# Patient Record
(demographics unavailable — no encounter records)

---

## 2024-10-18 NOTE — PROCEDURE
[Straight Catheterization] : insertion of a straight catheter [Urinary Tract Infection] : a urinary tract infection [___ Fr Straight Tip] : a [unfilled] in [place] straight tip catheter [None] : there were no complications with the catheter insertion [Clear] : clear [Culture] : culture

## 2024-10-18 NOTE — PHYSICAL EXAM
[Chaperone Present] : A chaperone was present in the examining room during all aspects of the physical examination [Labia Majora] : were normal [Normal Appearance] : general appearance was normal [Atrophy] : atrophy [No Bleeding] : there was no active vaginal bleeding [Absent] : absent [Normal] : no abnormalities [Exam Deferred] : was deferred [65098] : A chaperone was present during the pelvic exam. [FreeTextEntry2] : Elsa [FreeTextEntry1] : General: Well, appearing. Alert and orientated. No acute distress HEENT: Normocephalic, atraumatic and extraocular muscles appear to be intact Neck: Full range of motion, no obvious lymphadenopathy, deformities, or masses noted Respiratory: Speaking in full sentences comfortably, normal work of breathing and no cough during visit Musculoskeletal: full range of motion  Extremities: No upper extremity edema noted Skin: no obvious rash or skin lesions Neuro: Orientated X 3, speech is fluent, normal rate Psych: Normal mood and affect [Tenderness] : ~T no ~M abdominal tenderness observed [Distended] : not distended [Scar] : a scar was noted [Vulvar Atrophy] : vulvar atrophy [Cystocele] : a cystocele [2] : 2 [Aa ____] : Aa [unfilled] [Ba ____] : Ba [unfilled] [C ____] : C [unfilled] [GH ____] : GH [unfilled] [PB ____] : PB [unfilled] [TVL ____] : TVL  [unfilled] [Ap ____] : Ap [unfilled] [Bp ____] : Bp [unfilled] [D ____] : D [unfilled] [FreeTextEntry3] : (+) hypermobility, (-) cough stress test  [FreeTextEntry4] : No mesh seen on exam; however, there was a small divet at the vaginal cuff

## 2024-10-18 NOTE — LETTER BODY
[Dear  ___] : Dear  [unfilled], [I had the pleasure of evaluating your patient, [unfilled]. Thank you for referring Ms. [unfilled] for consultation for ___] : I had the pleasure of evaluating your patient, [unfilled]. Thank you for referring Ms. [unfilled] for consultation for [unfilled]. [Attached please find my note.] : Attached please find my note. [Thank you very much for allowing me to participate in the care of this patient. If you have any questions, please do not hesitate to contact me] : Thank you very much for allowing me to participate in the care of this patient. If you have any questions, please do not hesitate to contact me. [FreeTextEntry1] : prolapse, recurrent UTIs, mixed urgency and stress incontinence

## 2024-10-18 NOTE — DISCUSSION/SUMMARY
[Visit Time ___ Minutes] : [unfilled] minutes [FreeTextEntry1] : Images were used to review the findings of Stage 2 cystocele with the patient. Treatment options for the prolapse were discussed and included observation, pelvic floor exercises with or without physical therapy, a pessary, or surgical correction. With pelvic floor exercises, we discussed she may see improvement after a month if she performs them regularly. Surgically we discussed the vaginal routes with an anterior colporrhaphy. If interested in surgery, we discussed obtaining urodynamics.    We also reviewed her urinary symptoms and discussed possible etiologies including recurrent urinary tract infection, overactive bladder, mixed urgency and stress incontinence. Her PVR today was 30ml. We discussed her feeling of incomplete emptying and needing to strain is probably more related to her bladder sensation than an actual inability to empty. Cath urine specimen was sent for urine culture to rule out urinary tract infection. We discussed behavioral modifications including increased fluid intake, which has been shown to decrease the frequency and severity of UTIs. We also discussed avoidance of bladder irritants such as caffeine, carbonation, artificial sweeteners, alcohol, acidic foods/drinks (citrus, tomatoes, pineapple), spicy foods, and chocolate. In addition, we reviewed drinking slowly since ingesting large quantities of fluid can result in rapid distension of the bladder, which can worsen urinary symptoms. We also discussed management of recurrent UTIs with vaginal estrogen. In von- and postmenopausal women, we discussed the decreased level of estrogen in the vaginal tissues decrease the lactobacilli and changes the normal vaginal ary. Risk and benefits of vaginal estrogen were reviewed including its local action, minimal systemic absorption, and lack of association with any breast or GYN malignancies. She will start using her vaginal estrogen twice a week. We discussed the importance of providing a urine specimen to guide treatment if she is symptomatic.  In addition, we discussed avoidance of constipation, as retained feces can result in a higher bacterial load and increase her risk of UTIs. Specifically, we discussed increasing her fiber intake to at least 4 servings of raw fruits and vegetables, fiber supplements, stool softeners, and MiraLAX.  At this time, she will start PFE on her own for her prolapse. She will also make the fluid and behavioral modifications and start a bowel regimen for her urinary symptoms. She will bring in her prior UC results. As her incontinence is not as bothersome to her, we will hold off on additional management. She will return for cystoscopy for her history of sacrocolpopexy.   [facility] handout on POP, OAB, bladder training was given to her. RTO for cysto and then RPA for reevaluation of her urinary symptoms.

## 2024-10-18 NOTE — HISTORY OF PRESENT ILLNESS
[Urinary Frequency] : no [Feelings Of Urinary Urgency] : no [Stool Visible Blood] : no [Incomplete Emptying Of Stool] : no [Pelvic Pain] : no [Vaginal Pain] : no [Rectal Pain] : no [] : no [de-identified] : sometimes [FreeTextEntry5] : sometimes [de-identified] : ~1x/wk, with coughing  [de-identified] : ~1x/month  [de-identified] : 6-7x/d [de-identified] : sometimes  [de-identified] : 3x/n [de-identified] : sometimes [de-identified] : sometimes [FreeTextEntry6] : BM every 1-2d, sometimes hard, takes metamucil occasionally  [de-identified] : not sexually active  [FreeTextEntry1] : Antonia is a 68yo with bothersome pelvic pressure and bulge for several years. She also had a LSC mesh repair (likely sacrocolpopexy) in 2008 by a gynecologist in [place]. Her bulge symptoms recurred after 2y. In addition, she has had some leakage with coughing and occasionally with an urge. She voids 6-7x/d, 3x/n, and has occasional urgency.   In the last year, she has had 2-3 UTIs with positive cultures. When she has an infection, she has dysuria, urgency, frequency. Her symptoms resolved after antibiotics. She is using vaginal estrogen 1x/wk, no bleeding.   Denies prior evaluation or treatment for her symptoms.   PMH: DM (HbA1c 6.4%), HLD, HTN. Denies history of glaucoma PSH: TERRY/BSO (for cyst on ovaries and for prolapse, benign, done in 2006), LSC SCP, BTL, wrist surgery  Soc: Never smoker  All: NKDA  Daily fluid intake: 32-40oz water + 3c tea. 1c orange or coconut juice, 1 can soda once a week. No coffee, seltzer. Last drink at 11pm, goes to sleep at 12am.

## 2024-11-17 NOTE — ASSESSMENT
[FreeTextEntry1] : Cardiology Studies: ECG 6/28/24 NSR low voltage ECG 11/15/24 NSR low voltage   MRA from 2022 noted - normal caliber thoracic aorta  TTE 7/16/24 "CONCLUSIONS: 1. Left ventricular cavity is normal in size. Left ventricular wall thickness is normal. Left ventricular systolic function is normal with an ejection fraction of 65 % by Tobias's method of disks. There are no regional wall motion abnormalities seen. 2. Normal right ventricular cavity size, with normal wall thickness, and normal right ventricular systolic function. 3. Trileaflet aortic valve with normal systolic excursion. No aortic valve stenosis. 4. Structurally normal mitral valve with normal leaflet excursion. 5. Trace mitral regurgitation. 6. No pericardial effusion seen. 7. No prior echocardiogram is available for comparison."  67F h/o CVA 2007, HLD, HTN, newly diagnosed AF here for f/u.  # dizziness - pt reports episodes of dizziness last week and this week. Thinks it is related to metoprolol and eliquis and stopped both. Since stopping both she reports feeling better. Brings in BP readings with low SBP recorded last week in 90s. Does not think she was sick. Denies bleeding. In clinic she is hypertensive and overall says she feels better. Ddx includes arrhythmia. Unclear why pt had low BP recordings.  - ECG done today and shows SR  - spoke w EP NP - they will mail rhythm monitor to pt asap and schedule a f/u w Dr. Amaro  - pt to restart eliquis, she will hold bblocker and monitor her sx   #CARUSO - pt w risk factors for CAD. Reports CARUSO which may be an anginal equivalent. - CCTA not yet done, encouraged pt to get it scheduled   #h/o CVA - Neurology referral provided at last visit  - on eliquis and statin  #HTN - BP elevated in clinic.  - BP diary, pt to call clinic with results - monitor for now while acute sx resolve and work up is underway   Signs and symptoms of heart attack reviewed with patient and advised to call 911 and seek emergency medical care in case of such event. If any issues with scheduling testing patient advised to call the clinic. Patient voiced understanding  F/u 1 mo or sooner as clinically indicated

## 2024-11-17 NOTE — HISTORY OF PRESENT ILLNESS
[FreeTextEntry1] : Referred to see cardiologist by PCP Chu [phone]   67F h/o CVA 2007, HLD, HTN, newly diagnosed AF here for f/u. With regards to CVA was ?attributed to HTN and DM, but is not sure.  Pt was referred to EP for palpitations, was diagnosed with AF and was started on eliquis and metoprolol. Last week developed palpitations. Again on Monday developed palpitations and dizziness, it was limiting her. No chest pain, felt throat discomfort. Stopped metoprolol and eliquis. Has not done CCTA.   Clinic 6/2024 - referred to EP. TTE showed normal biv function.   PSH: Hysterectomy  Family History: Mother - passed from old age in 80s, had DM and ?enlarged heart  Father - passed in 90s Siblings - brother passed from severe DM and heart attack 61 No SCD in relatives <50 years of age  Social History: Tobacco - denies EtOH - denies Illicits - denies

## 2024-11-17 NOTE — HISTORY OF PRESENT ILLNESS
[FreeTextEntry1] : Referred to see cardiologist by PCP Chu [phone]   67F h/o CVA 2007, HLD, HTN, newly diagnosed AF here for f/u. With regards to CVA was ?attributed to HTN and DM, but is not sure.  Pt was referred to EP for palpitations, was diagnosed with AF and was started on eliquis and metoprolol. Last week developed palpitations. Again on Monday developed palpitations and dizziness, it was limiting her. No chest pain, felt throat discomfort. Stopped metoprolol and eliquis. Has not done CCTA.   Clinic 6/2024 - referred to EP. TTE showed normal biv function.   PSH: Hysterectomy  Family History: Mother - passed from old age in 80s, had DM and ?enlarged heart  Father - passed in 90s Siblings - brother passed from severe DM and heart attack 61 No SCD in relatives <50 years of age  Social History: Tobacco - denies EtOH - denies Illicits - denies

## 2024-11-22 NOTE — DISCUSSION/SUMMARY
[Visit Time ___ Minutes] : [unfilled] minutes [FreeTextEntry1] :  Antonia is a 66yo who presents for follow up of mixed incontinence, Stage 2 cystocele, recurrent UTIs, and constipation. We reviewed her urinary symptoms and treatment options. We discussed fluid and behavioral modifications, bladder training, pelvic floor physical therapy, and medications including anticholinergics and beta agonists. Specifically, we discussed avoidance of bladder irritants such as caffeine, carbonation, artificial sweeteners, alcohol, acidic foods/drinks (citrus, tomatoes, pineapple), spicy foods, and chocolate. In addition, we reviewed drinking slowly since ingesting large quantities of fluid can result in rapid distension of the bladder, which can worsen urinary symptoms. We discussed stopping eating and drinking at least 2 hours prior to bed to reduce her nighttime urination. She has had some improvement with fluid and behavioral modifications. We discussed trying a medication if she is not satisfied with her symptom control. If no improvement with medications and fluid and behavioral modifications, we would proceed with urodynamics to further evaluate her symptoms before moving onto posterior tibial nerve stimulation, intradetrusor botox, or sacral neuromodulation.   For her stress incontinence and Stage 2 cystocele, she has been performing pelvic floor exercises on her own. We discussed doing 3 sets of 10 contractions per day and doing a combination of short and long squeezes. We reviewed pelvic floor PT as an adjunct if she is interested or if she does not have improvement despite doing regular exercises for at least 3 months. We also reviewed other treatment options like a pessary or surgical correction.   Regarding her recurrent UTIs, she has been doing well with vaginal estrogen and cranberry tablets. We discussed additional management with vitamin C, methenamine, or a daily low-dose antibiotic if she develops more UTIs. We also discussed avoidance of constipation, as it can increase her risk of infection and contribute to poor bladder function. Specifically, we discussed increasing her fiber intake to at least 4 servings of raw fruits and vegetables, fiber supplements, stool softeners, and MiraLAX. We discussed the importance of providing a urine specimen to guide treatment if she is symptomatic.  At this time, she feels she has been improving overall. She will continue with the fluid and behavioral modifications and with pelvic floor exercises on her own for her urinary symptoms and prolapse. She will also continue with the vaginal estrogen and cranberry pills for her UTIs and will work on her constipation.  RTO  for UTI, POP, YAIR follow up.

## 2024-11-22 NOTE — DISCUSSION/SUMMARY
[Visit Time ___ Minutes] : [unfilled] minutes [FreeTextEntry1] :  Antonia is a 68yo who presents for follow up of mixed incontinence, Stage 2 cystocele, recurrent UTIs, and constipation. We reviewed her urinary symptoms and treatment options. We discussed fluid and behavioral modifications, bladder training, pelvic floor physical therapy, and medications including anticholinergics and beta agonists. Specifically, we discussed avoidance of bladder irritants such as caffeine, carbonation, artificial sweeteners, alcohol, acidic foods/drinks (citrus, tomatoes, pineapple), spicy foods, and chocolate. In addition, we reviewed drinking slowly since ingesting large quantities of fluid can result in rapid distension of the bladder, which can worsen urinary symptoms. We discussed stopping eating and drinking at least 2 hours prior to bed to reduce her nighttime urination. She has had some improvement with fluid and behavioral modifications. We discussed trying a medication if she is not satisfied with her symptom control. If no improvement with medications and fluid and behavioral modifications, we would proceed with urodynamics to further evaluate her symptoms before moving onto posterior tibial nerve stimulation, intradetrusor botox, or sacral neuromodulation.   For her stress incontinence and Stage 2 cystocele, she has been performing pelvic floor exercises on her own. We discussed doing 3 sets of 10 contractions per day and doing a combination of short and long squeezes. We reviewed pelvic floor PT as an adjunct if she is interested or if she does not have improvement despite doing regular exercises for at least 3 months. We also reviewed other treatment options like a pessary or surgical correction.   Regarding her recurrent UTIs, she has been doing well with vaginal estrogen and cranberry tablets. We discussed additional management with vitamin C, methenamine, or a daily low-dose antibiotic if she develops more UTIs. We also discussed avoidance of constipation, as it can increase her risk of infection and contribute to poor bladder function. Specifically, we discussed increasing her fiber intake to at least 4 servings of raw fruits and vegetables, fiber supplements, stool softeners, and MiraLAX. We discussed the importance of providing a urine specimen to guide treatment if she is symptomatic.  At this time, she feels she has been improving overall. She will continue with the fluid and behavioral modifications and with pelvic floor exercises on her own for her urinary symptoms and prolapse. She will also continue with the vaginal estrogen and cranberry pills for her UTIs and will work on her constipation.  RTO  for UTI, POP, YAIR follow up.

## 2024-11-22 NOTE — HISTORY OF PRESENT ILLNESS
[] : no [de-identified] : sometimes [FreeTextEntry5] : sometimes [de-identified] : ~1x/month  [de-identified] : ~1x/wk, with coughing  [de-identified] : 5-6x/d [de-identified] : sometimes  [de-identified] : 2-3x/n [de-identified] : sometimes [de-identified] : 3-4x/wk  [FreeTextEntry6] : Daily BM, softer, takes metamucil occasionally  [de-identified] : not sexually active  [FreeTextEntry1] : Antonia is a 68yo with Stage 2 cystocele, mixed incontinence-urgency predominant. She had a history of a LSC SCP. She was started on pelvic floor exercises on 10/18/24, which she is doing every day when she remembers. Her leakage doesn't bother her much. She has made some of the fluid and behavioral modifications. Her urinary symptoms and constipation has improved. She had a cystoscopy that showed some mild trabeculations.   She also has recurrent UTIs and had 2-3 UTIs with positive cultures last year. When she has an infection, she has dysuria, urgency, frequency. Her symptoms resolved after antibiotics. She is using vaginal estrogen 2x/wk, no bleeding. No UTIs since her last visit.   Denies prior evaluation or treatment for her symptoms.   PMH: DM (HbA1c 6.4%), HLD, HTN. Denies history of glaucoma PSH: TERRY/BSO (for cyst on ovaries and for prolapse, benign, done in 2006), LSC SCP, BTL, wrist surgery  Soc: Never smoker  All: NKDA  Daily fluid intake: 5-6 x8oz water + 2c tea. 1c coconut water once a week. 1 can soda every 2 weeks. No coffee, seltzer. Last drink at 11pm, goes to sleep at 12am.

## 2024-11-22 NOTE — HISTORY OF PRESENT ILLNESS
[] : no [de-identified] : sometimes [FreeTextEntry5] : sometimes [de-identified] : ~1x/month  [de-identified] : ~1x/wk, with coughing  [de-identified] : 5-6x/d [de-identified] : sometimes  [de-identified] : 2-3x/n [de-identified] : sometimes [de-identified] : 3-4x/wk  [FreeTextEntry6] : Daily BM, softer, takes metamucil occasionally  [de-identified] : not sexually active  [FreeTextEntry1] : Antonia is a 68yo with Stage 2 cystocele, mixed incontinence-urgency predominant. She had a history of a LSC SCP. She was started on pelvic floor exercises on 10/18/24, which she is doing every day when she remembers. Her leakage doesn't bother her much. She has made some of the fluid and behavioral modifications. Her urinary symptoms and constipation has improved. She had a cystoscopy that showed some mild trabeculations.   She also has recurrent UTIs and had 2-3 UTIs with positive cultures last year. When she has an infection, she has dysuria, urgency, frequency. Her symptoms resolved after antibiotics. She is using vaginal estrogen 2x/wk, no bleeding. No UTIs since her last visit.   Denies prior evaluation or treatment for her symptoms.   PMH: DM (HbA1c 6.4%), HLD, HTN. Denies history of glaucoma PSH: TERRY/BSO (for cyst on ovaries and for prolapse, benign, done in 2006), LSC SCP, BTL, wrist surgery  Soc: Never smoker  All: NKDA  Daily fluid intake: 5-6 x8oz water + 2c tea. 1c coconut water once a week. 1 can soda every 2 weeks. No coffee, seltzer. Last drink at 11pm, goes to sleep at 12am.

## 2024-12-11 NOTE — CARDIOLOGY SUMMARY
[de-identified] : 10/2/24  Sinus rhythm at 64 bpm 8/7/24 Sinus rhythm at 77 bpm with artifact [de-identified] : 7/16/2024: 1. Left ventricular cavity is normal in size. Left ventricular wall thickness is normal. Left ventricular systolic function is normal with an ejection fraction of 65 % by Tobias's method of disks. There are no regional wall motion abnormalities seen.  2. Normal right ventricular cavity size, with normal wall thickness, and normal right ventricular systolic function.  3. Trileaflet aortic valve with normal systolic excursion. No aortic valve stenosis.  4. Structurally normal mitral valve with normal leaflet excursion.  5. Trace mitral regurgitation.  6. No pericardial effusion seen.  7. No prior echocardiogram is available for comparison.

## 2024-12-11 NOTE — HISTORY OF PRESENT ILLNESS
[FreeTextEntry1] : Referring Physician: Dr. Monique Horta  Dear Dr. Horta  Mrs. Antonia Calixto was seen in the [facility] today. For our records, please allow me to summarize the history and my findings.  This pleasant 67-year-old woman has a cardiovascular history significant for CVA 2007, HLD, HTN, DM, and paroxysmal atrial fibrillation.  She reports dyspnea with climbing 2 floors has been ongoing 2-3yrs. Reports "palpitations" every 2-3 months. Feels fast heart rate and dizziness. She had never passed out.  She underwent an event monitor 09/2024 which revealed 4% burden of atrial fibrillation.  She was started on Eliquis at that time. She reports continued palpitations.  She also reports some chest heaviness which she was referred for CT coronary pending on 12/13. Repeat event monitor showing afib with RVR on 11/22 correlating with symptoms of palpitations.   She is on low dose metoprolol 25 mg daily.    Mrs. Calixto denies any recent history of shortness of breath, or syncope.

## 2024-12-11 NOTE — CARDIOLOGY SUMMARY
[de-identified] : 10/2/24  Sinus rhythm at 64 bpm 8/7/24 Sinus rhythm at 77 bpm with artifact [de-identified] : 7/16/2024: 1. Left ventricular cavity is normal in size. Left ventricular wall thickness is normal. Left ventricular systolic function is normal with an ejection fraction of 65 % by Tobias's method of disks. There are no regional wall motion abnormalities seen.  2. Normal right ventricular cavity size, with normal wall thickness, and normal right ventricular systolic function.  3. Trileaflet aortic valve with normal systolic excursion. No aortic valve stenosis.  4. Structurally normal mitral valve with normal leaflet excursion.  5. Trace mitral regurgitation.  6. No pericardial effusion seen.  7. No prior echocardiogram is available for comparison.

## 2024-12-11 NOTE — HISTORY OF PRESENT ILLNESS
[FreeTextEntry1] : Referring Physician: Dr. Monique Horta  Dear Dr. Horta  Mrs. Antonia Calixto was seen in the [facility] today. For our records, please allow me to summarize the history and my findings.  This pleasant 67-year-old woman has a cardiovascular history significant for CVA 2007, HLD, HTN, DM, and paroxysmal atrial fibrillation.  She reports dyspnea with climbing 2 floors has been ongoing 2-3yrs. Reports "palpitations" every 2-3 months. Feels fast heart rate and dizziness. She had never passed out.  She underwent an event monitor 09/2024 which revealed 4% burden of atrial fibrillation.  She was started on Eliquis at that time. She reports continued palpitations.  She also reports some chest heaviness which she was referred for CT coronary pending on 12/13. Repeat event monitor showing afib with RVR on 11/22 correlating with symptoms of palpitations.   She is on low dose metoprolol 25 mg daily.    Mrs. Calixto denies any recent history of shortness of breath, or syncope.

## 2024-12-11 NOTE — PHYSICAL EXAM
[Well Developed] : well developed [Well Nourished] : well nourished [No Acute Distress] : no acute distress [Normal Conjunctiva] : normal conjunctiva [Normal Venous Pressure] : normal venous pressure [No Carotid Bruit] : no carotid bruit [Normal S1, S2] : normal S1, S2 [No Gallop] : no gallop [Clear Lung Fields] : clear lung fields [Good Air Entry] : good air entry [No Respiratory Distress] : no respiratory distress  [Soft] : abdomen soft [Non Tender] : non-tender [Normal Bowel Sounds] : normal bowel sounds [Normal Gait] : normal gait [No Edema] : no edema [No Cyanosis] : no cyanosis [No Clubbing] : no clubbing [No Varicosities] : no varicosities [No Rash] : no rash [No Skin Lesions] : no skin lesions [Moves all extremities] : moves all extremities [No Focal Deficits] : no focal deficits [Normal Speech] : normal speech [Alert and Oriented] : alert and oriented [Normal memory] : normal memory

## 2024-12-11 NOTE — DISCUSSION/SUMMARY
[EKG obtained to assist in diagnosis and management of assessed problem(s)] : EKG obtained to assist in diagnosis and management of assessed problem(s) [FreeTextEntry1] : In summary, this is a 67-year-old woman with CVA 2007, HLD, HTN, DM, paroxysmal atrial fibrillation.   EKG performed today to assess for presence of conduction disease and reveals NSR. Her symptoms of palpitations correlated to A-fib given her symptoms she would benefit from rhythm control.  We discussed AAD versus ablation today.  Think she would benefit long-term from an ablation procedure given her age symptoms. The rationale for the procedure as well as its risks--including but not limited to bleeding, vascular injury, pericardial effusion/tamponade, heart block requiring pacemaker, stroke, and death--were reviewed in detail.  She would like to think about it.  CT coronary pending 12/13.  She will continue on metoprolol and Eliquis at this time.  Mrs. Cavanaugh appeared to understand the whole discussion and verbalized that all of his questions were answered to his satisfaction.  Thank you for allowing me to be involved in the care of this pleasant man. Please feel free to contact me with any questions.